# Patient Record
Sex: MALE | Race: WHITE | NOT HISPANIC OR LATINO
[De-identification: names, ages, dates, MRNs, and addresses within clinical notes are randomized per-mention and may not be internally consistent; named-entity substitution may affect disease eponyms.]

---

## 2024-05-31 ENCOUNTER — APPOINTMENT (OUTPATIENT)
Dept: ORTHOPEDIC SURGERY | Facility: CLINIC | Age: 44
End: 2024-05-31
Payer: OTHER MISCELLANEOUS

## 2024-05-31 VITALS — BODY MASS INDEX: 24.32 KG/M2 | HEIGHT: 65 IN | RESPIRATION RATE: 16 BRPM | WEIGHT: 146 LBS

## 2024-05-31 DIAGNOSIS — Z87.39 PERSONAL HISTORY OF OTHER DISEASES OF THE MUSCULOSKELETAL SYSTEM AND CONNECTIVE TISSUE: ICD-10-CM

## 2024-05-31 DIAGNOSIS — Z87.898 PERSONAL HISTORY OF OTHER SPECIFIED CONDITIONS: ICD-10-CM

## 2024-05-31 DIAGNOSIS — Z78.9 OTHER SPECIFIED HEALTH STATUS: ICD-10-CM

## 2024-05-31 DIAGNOSIS — Z80.9 FAMILY HISTORY OF MALIGNANT NEOPLASM, UNSPECIFIED: ICD-10-CM

## 2024-05-31 DIAGNOSIS — M79.89 OTHER SPECIFIED SOFT TISSUE DISORDERS: ICD-10-CM

## 2024-05-31 DIAGNOSIS — R63.5 ABNORMAL WEIGHT GAIN: ICD-10-CM

## 2024-05-31 PROBLEM — Z00.00 ENCOUNTER FOR PREVENTIVE HEALTH EXAMINATION: Status: ACTIVE | Noted: 2024-05-31

## 2024-05-31 PROCEDURE — 99203 OFFICE O/P NEW LOW 30 MIN: CPT

## 2024-05-31 PROCEDURE — 73130 X-RAY EXAM OF HAND: CPT | Mod: 50

## 2024-05-31 NOTE — PHYSICAL EXAM
[de-identified] : Physical exam shows the patient to be alert and oriented x3, capable of ambulation. The patient is well-developed and well-nourished in no apparent respiratory distress. Majority of the skin is intact bilaterally in the upper extremities without lymphadenopathy at the elbows.  The wrists have a symmetric range of motion bilaterally. There is no tenderness over the scaphoid, scapholunate or lunotriquetral ligaments bilaterally. There is a negative Mcgarry test bilaterally. There is negative tenderness over the radial ulnar joint or TFCC and no evidence of instability bilaterally. No tenderness over the pisotriquetral or hamate hook or CMC joint bilaterally. There is 5 over 5 strength of the wrists bilaterally.  There is swelling and tenderness localized over the radial collateral ligament of the right index finger without evidence of significant instability as compared to the opposite side but a firm endpoint cannot be determined since the patient is guarding the area and a complete exam could not be achieved.  There is no tenderness over the PIP or DIP joint no evidence of digital malrotation in the resting position.  Range of motion is 0/70 of the MP, 0/out of 10 of the PIP and 0/90 of the DIP joint with active equaling passive range of motion.  There is tenderness over the A1 pulley but without locking upon compression   strength is 50 pounds on the right and 100 pounds on the left Right-hand-dominant [de-identified] : PA lateral and oblique of both hands shows no evidence of fractures or dislocations with joint spaces well-preserved.  No significant degenerative changes and no soft tissue calcifications bilaterally

## 2024-05-31 NOTE — ASSESSMENT
[FreeTextEntry1] : 12 weeks status post right index MP radial collateral ligament tear with residual MP stiffness and flexor tendinitis which is not improving over time despite rest and activity modifications and intermittent anti-inflammatories.  Patient has recently lost his job and is not working.  He is cleared to return to work lifting no greater than 5 pounds with the right upper extremity but if this is not available he should not be doing that type of work.  Since symptoms have not improved over time would like to proceed with an MRI and based on results more aggressive forms of treatment such as therapy injection or repair can be considered.

## 2024-05-31 NOTE — HISTORY OF PRESENT ILLNESS
[Has the patient missed work because of the injury/illness?] : The patient has missed work because of the injury/illness. [No] : The patient is currently not working. 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  Patient was treated at [facility] where x-rays were done showing no evidence of a fracture.  He was given a splint to wear and he has been taking pain medication when needed patient complains of second metacarpal pain with gripping.   [FreeTextEntry2] : [employer] and delivery [FreeTextEntry3] : Unable to carry anything heavy. Experiencing numbness and tingling of the right  hand. Unable to make a fist. [FreeTextEntry5] : No [FreeTextEntry6] : 3/6/24

## 2024-06-04 ENCOUNTER — APPOINTMENT (OUTPATIENT)
Dept: ORTHOPEDIC SURGERY | Facility: CLINIC | Age: 44
End: 2024-06-04
Payer: OTHER MISCELLANEOUS

## 2024-06-04 VITALS
HEIGHT: 65 IN | SYSTOLIC BLOOD PRESSURE: 119 MMHG | DIASTOLIC BLOOD PRESSURE: 79 MMHG | OXYGEN SATURATION: 98 % | BODY MASS INDEX: 24.32 KG/M2 | HEART RATE: 99 BPM | WEIGHT: 146 LBS

## 2024-06-04 DIAGNOSIS — G89.29 CERVICALGIA: ICD-10-CM

## 2024-06-04 DIAGNOSIS — M54.2 CERVICALGIA: ICD-10-CM

## 2024-06-04 PROCEDURE — 72083 X-RAY EXAM ENTIRE SPI 4/5 VW: CPT

## 2024-06-04 PROCEDURE — 99204 OFFICE O/P NEW MOD 45 MIN: CPT

## 2024-06-04 NOTE — HISTORY OF PRESENT ILLNESS
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 Sustained neck and low back pain, severe. Radiates to left shoulder and to both lower extremities with associated parasthesias. Multiple urgent care visits Has failed NSAID and muscle relaxant out of work since injury due to pain syndromes.

## 2024-06-04 NOTE — DISCUSSION/SUMMARY
[de-identified] : Neck pain, low back pain with associated cervical and lumbar radiculopathy since injury on 3/6/24, failed oral NSAID Rx and oral muscle relaxant Rx. -recommend MRI lumbar and cervical non contrast -start PT cervical and lumbar -follow up after imaging.

## 2024-06-04 NOTE — PHYSICAL EXAM
[de-identified] : Physical Exam:  General: patient is well developed, well nourished, in no acute  distress, alert and oriented x 3.   Mood and affect: normal  Respiratory: no respiratory distress noted  Skin: no scars over spine, skin intact, no erythema, increased warmth  Alignment:The spine is well compensated in the coronal and sagittal plane.  Gait: The patient is able to toe walk and heel walk without difficulty.  Palpation: no tenderness to palpation midline along spine or paraspinal region  Range of motion: Cervical and Lumbar spine ROM is restricted  Neurologic Exam: Motor: Manual Muscle testing in the upper and lower extremities is 5 out of 5 in all muscle groups. There is no evidence of muscular atrophy in the upper extremities. Sensory: Sensation to light touch is grossly intact in the upper and lower extremities  Reflexes: DTR are present and symmetric throughout, negative reyes bilaterally, negative inverted radial reflex bilaterally, no clonus, plantar responses are flexor  Special tests: Spurlings sign absent. Lhermitte's sign is absent. Straight Leg Raise Negative, Cross Straight Leg Raise Negative, YANNI Test Negative  Hip Exam: Full painless ROM of bilateral hips  Vascular: Examination of the peripheral vascular system demonstrates no evidence of congestion or edema. no lymphedema bilateral lower extremities, pulses are present and symmetric in both lower extremities. [de-identified] : Xray 6/4/24 full spine ap lat with lumbar flex ex: my read: cervical kyphosis, mild disc disesase cervical spine, no fractures or instability seen

## 2024-06-17 ENCOUNTER — APPOINTMENT (OUTPATIENT)
Dept: ORTHOPEDIC SURGERY | Facility: CLINIC | Age: 44
End: 2024-06-17
Payer: OTHER MISCELLANEOUS

## 2024-06-17 DIAGNOSIS — S69.91XA UNSPECIFIED INJURY OF RIGHT WRIST, HAND AND FINGER(S), INITIAL ENCOUNTER: ICD-10-CM

## 2024-06-17 PROCEDURE — 99442: CPT

## 2024-06-18 ENCOUNTER — APPOINTMENT (OUTPATIENT)
Dept: MRI IMAGING | Facility: HOSPITAL | Age: 44
End: 2024-06-18

## 2024-06-26 ENCOUNTER — APPOINTMENT (OUTPATIENT)
Dept: PHYSICAL MEDICINE AND REHAB | Facility: CLINIC | Age: 44
End: 2024-06-26
Payer: OTHER MISCELLANEOUS

## 2024-06-26 VITALS
TEMPERATURE: 97.5 F | DIASTOLIC BLOOD PRESSURE: 76 MMHG | WEIGHT: 146 LBS | HEIGHT: 65 IN | HEART RATE: 74 BPM | SYSTOLIC BLOOD PRESSURE: 110 MMHG | OXYGEN SATURATION: 96 % | BODY MASS INDEX: 24.32 KG/M2

## 2024-06-26 DIAGNOSIS — M79.18 MYALGIA, OTHER SITE: ICD-10-CM

## 2024-06-26 DIAGNOSIS — M54.50 LOW BACK PAIN, UNSPECIFIED: ICD-10-CM

## 2024-06-26 DIAGNOSIS — R20.2 PARESTHESIA OF SKIN: ICD-10-CM

## 2024-06-26 DIAGNOSIS — G89.29 LOW BACK PAIN, UNSPECIFIED: ICD-10-CM

## 2024-06-26 DIAGNOSIS — Z91.81 HISTORY OF FALLING: ICD-10-CM

## 2024-06-26 DIAGNOSIS — R29.898 OTHER SYMPTOMS AND SIGNS INVOLVING THE MUSCULOSKELETAL SYSTEM: ICD-10-CM

## 2024-06-26 PROCEDURE — G2211 COMPLEX E/M VISIT ADD ON: CPT | Mod: NC,1L

## 2024-06-26 PROCEDURE — 99205 OFFICE O/P NEW HI 60 MIN: CPT

## 2024-06-27 ENCOUNTER — TRANSCRIPTION ENCOUNTER (OUTPATIENT)
Age: 44
End: 2024-06-27

## 2024-07-31 ENCOUNTER — APPOINTMENT (OUTPATIENT)
Dept: PHYSICAL MEDICINE AND REHAB | Facility: CLINIC | Age: 44
End: 2024-07-31
Payer: OTHER MISCELLANEOUS

## 2024-07-31 VITALS
SYSTOLIC BLOOD PRESSURE: 108 MMHG | TEMPERATURE: 98.5 F | DIASTOLIC BLOOD PRESSURE: 73 MMHG | BODY MASS INDEX: 24.32 KG/M2 | OXYGEN SATURATION: 97 % | HEIGHT: 65 IN | WEIGHT: 146 LBS | HEART RATE: 69 BPM

## 2024-07-31 DIAGNOSIS — M62.830 MUSCLE SPASM OF BACK: ICD-10-CM

## 2024-07-31 DIAGNOSIS — Z91.81 HISTORY OF FALLING: ICD-10-CM

## 2024-07-31 DIAGNOSIS — M54.50 LOW BACK PAIN, UNSPECIFIED: ICD-10-CM

## 2024-07-31 DIAGNOSIS — M54.9 DORSALGIA, UNSPECIFIED: ICD-10-CM

## 2024-07-31 DIAGNOSIS — M67.959 UNSPECIFIED DISORDER OF SYNOVIUM AND TENDON, UNSPECIFIED THIGH: ICD-10-CM

## 2024-07-31 DIAGNOSIS — M76.899 OTHER SPECIFIED ENTHESOPATHIES OF UNSPECIFIED LOWER LIMB, EXCLUDING FOOT: ICD-10-CM

## 2024-07-31 DIAGNOSIS — M79.18 MYALGIA, OTHER SITE: ICD-10-CM

## 2024-07-31 DIAGNOSIS — G89.29 LOW BACK PAIN, UNSPECIFIED: ICD-10-CM

## 2024-07-31 PROCEDURE — G2211 COMPLEX E/M VISIT ADD ON: CPT | Mod: NC,1L

## 2024-07-31 PROCEDURE — 99215 OFFICE O/P EST HI 40 MIN: CPT

## 2024-07-31 NOTE — ASSESSMENT
[Indicate if, in your opinion, the incident that the patient described was the competent medical cause of this injury/illness.] : The incident that the patient described was the competent medical cause of this injury/illness: Yes [Indicate if the patient's complaints are consistent with his/her history of the injury/illness.] : Indicate if the patient's complaints are consistent with his/her history of the injury/illness: Yes [Yes] : Yes, it is consistent [FreeTextEntry1] : Jul 31, 2024 PATIENT is experiencing global lower limb weakness that warrants investigation with MRI and EMG.  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

## 2024-07-31 NOTE — DATA REVIEWED
[FreeTextEntry1] : Jun 24, 2024 read of 6/4/2024 Lumbar Spine XR [6 views, AP, Lateral, Flexion, Extension, Left/right Obliques]: Alignment within normal limits No significant listhesis present except for retrolisthesis L5 on S1 grade 1 on flexion and extension Mild endplate degeneration present throughout the lumbar spine Loss of disc heights diffusely most marked at L5-S1. No gross compression fractures or vertebral deformity present.

## 2024-07-31 NOTE — HISTORY OF PRESENT ILLNESS
[Has the patient missed work because of the injury/illness?] : The patient has missed work because of the injury/illness. [Yes] : The patient is currently working. [Resumed limited work of any kind: ______] : The patient resumed limited work on [unfilled]. 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

## 2024-07-31 NOTE — PHYSICAL EXAM
[FreeTextEntry1] : Gen: A+O x 3 in NAD Psych: Normal mood and affect. Responds appropriately to commands Eyes: Anicteric. No discharge. EOMI. Resp: Breathing unlabored CV: DP pulses 2+ and equal. No varicosities noted Ext: No c/c/e Skin: No lesions noted   Gait: Jul 31, 2024 + antalgic Jul 31, 2024 +  reciprocating heel to toe able to stand on toes and heels WITH hand holding/both hands on counter-top Jul 31, 2024 with great difficulty able to Tandem gait intact WITH hand holding Poor single leg standing balance, B/L Romberg negative   Neg Trendelenburg sign with single leg stance   Inspection: Spine alignment is midline. Palpation: There is + tenderness over the midline spinous processes, paravertebral muscles of the thoracolumbar region, R >> L   Lumbar ROM: Flexion, extension, side-bending, rotation, on Jun 26, 2024 markedly limited in ALL planes  Jul 31, 2024 +pain with lateral flexion Jul 31, 2024 +pain with oblique extension Jul 31, 2024 +pain with lateral rotation   Hip ROM: + pain at terminal ROM bilaterally. FAIR, FABERE equivocal bilaterally, unable to range to the appropriate positioning for testing   + weakness with resisted external rotation of the hip flexed to 90, knee flexed to 90, and hip externally rotated 20 degrees RIGHT (gluteus medius) + weakness with resisted external rotation of the hip flexed to 90, knee flexed to 90, and hip externally rotated 20 degrees LEFT (gluteus medius)   TEST REGION      Hip Flex   Knee Ext   Ankle Dorsi  EHL   Ankle Plantar Strength Right Side  4/5         4/5                  4/5           4/5              4/5                         Strength Left Side.    4/5         4/5                  4/5           4/5              4/5                           Hip abduction R 4/5 L 4/5 Hip adduction R 4/5 L 4/5 Hip extension R 4/5 L 4/5 Knee Flexion R 4/5 L 4/5   deferred Jul 31, 2024 perform 10 calf raises on the left deferred Jul 31, 2024 perform 10 calf raises on the right   Tone: Normal. No clonus. Sensation: Grossly intact to light touch bilateral lower limbs. Proprioception: Intact at big toes bilaterally. Reflexes: 1+ symmetric knee jerk, ankle jerk. Plantars deferred bilaterally Jul 31, 2024    SLR equivocal unable to flex hip to appropriate height/angle bilaterally Crossed SLR equivocal unable to flex hip to appropriate height/angle bilaterally. Slump Test equivocal   deferredprone gapping test deferred yeoman deferred nadine B/L

## 2024-08-13 ENCOUNTER — APPOINTMENT (OUTPATIENT)
Dept: PHYSICAL MEDICINE AND REHAB | Facility: CLINIC | Age: 44
End: 2024-08-13
Payer: OTHER MISCELLANEOUS

## 2024-08-13 DIAGNOSIS — M79.18 MYALGIA, OTHER SITE: ICD-10-CM

## 2024-08-13 DIAGNOSIS — M51.26 OTHER INTERVERTEBRAL DISC DISPLACEMENT, LUMBAR REGION: ICD-10-CM

## 2024-08-13 PROCEDURE — 99204 OFFICE O/P NEW MOD 45 MIN: CPT | Mod: 25

## 2024-08-13 PROCEDURE — 20552 NJX 1/MLT TRIGGER POINT 1/2: CPT

## 2024-08-13 NOTE — HISTORY OF PRESENT ILLNESS
[FreeTextEntry1] : Location: back Severity: 8/10 Duration: 5 months Context: on 3/6/24, while at work, he had a slip and fall while carrying heavy boxes on the job.  Denies prior back pain.  Aggravating Factors: movement, bending Alleviating Factors: meds from [facility] Associated Symptoms: denies fever, chills, change in bowel/bladder habits,+ weakness, +numbness/tingling, radiation down legs, weight loss Prior Studies: xray, EMG

## 2024-08-13 NOTE — ASSESSMENT
[FreeTextEntry1] : Discussed diagnosis and treatment plan including PT. Avoid back flexion.  Limit sitting.  Ice back often.  Lift things properly.  Massage back with tennis ball.  75% temp disability.  He was fired after his injury.   send us EMG report f/u with Dr Lee PT pending approval get us WC # call me back with name of meds  f/u 4 wk

## 2024-08-13 NOTE — PROCEDURE
[de-identified] : Indication: myofascial pain   After informed consent, he elected to proceed with a trigger point injection into the bilateral L3 and L5 paraspinals. I confirmed no prior adverse reactions, no active infections, and no relevant allergies.   The skin was prepped in the usual sterile manner.  The sites were injected with Lidocaine followed by local needling. The injection was completed without complication and a bandage was applied. He tolerated the procedure well and was given post-injection instructions.   Cold Tx x 48 hours, analgesics prn. Medications: 0.5 ml of 1% Lidocaine per site   Exp 7/2025 Manufacture: [employer] [id number] LOT [id number]

## 2024-08-13 NOTE — PHYSICAL EXAM
[FreeTextEntry1] : MARY is a 43 year old male  Constitutional: healthy appearing, NAD, and normal body habitus  LUMBAR ROM: flexion to 30 deg  Gait: normal  Inspection: no erythema, warmth Spine: no TTP in spinous process Bony palpation: no TTP in GT  Soft tissue palpation hip: no TTP in gluteus sergio Soft tissue palpation of spine: TTP in lumbar paraspinals  5/5 bilateral DF, PF, 5- right KE, 4 left KE sensation intact in bilat LE reflexes: knee and ankle   Special tests: neg seated SLR